# Patient Record
Sex: FEMALE | Race: WHITE | NOT HISPANIC OR LATINO | Employment: FULL TIME | URBAN - METROPOLITAN AREA
[De-identification: names, ages, dates, MRNs, and addresses within clinical notes are randomized per-mention and may not be internally consistent; named-entity substitution may affect disease eponyms.]

---

## 2024-09-06 ENCOUNTER — LAB VISIT (OUTPATIENT)
Dept: LAB | Facility: HOSPITAL | Age: 24
End: 2024-09-06
Attending: NURSE PRACTITIONER
Payer: COMMERCIAL

## 2024-09-06 ENCOUNTER — OFFICE VISIT (OUTPATIENT)
Dept: INTERNAL MEDICINE | Facility: CLINIC | Age: 24
End: 2024-09-06
Payer: COMMERCIAL

## 2024-09-06 VITALS
SYSTOLIC BLOOD PRESSURE: 124 MMHG | DIASTOLIC BLOOD PRESSURE: 70 MMHG | WEIGHT: 127.63 LBS | TEMPERATURE: 99 F | OXYGEN SATURATION: 100 % | BODY MASS INDEX: 24.93 KG/M2 | HEART RATE: 90 BPM

## 2024-09-06 DIAGNOSIS — Z00.00 ANNUAL PHYSICAL EXAM: ICD-10-CM

## 2024-09-06 DIAGNOSIS — F41.1 GAD (GENERALIZED ANXIETY DISORDER): ICD-10-CM

## 2024-09-06 DIAGNOSIS — Z00.00 ANNUAL PHYSICAL EXAM: Primary | ICD-10-CM

## 2024-09-06 LAB
BASOPHILS # BLD AUTO: 0.02 K/UL (ref 0–0.2)
BASOPHILS NFR BLD: 0.4 % (ref 0–1.9)
DIFFERENTIAL METHOD BLD: NORMAL
EOSINOPHIL # BLD AUTO: 0.1 K/UL (ref 0–0.5)
EOSINOPHIL NFR BLD: 2.3 % (ref 0–8)
ERYTHROCYTE [DISTWIDTH] IN BLOOD BY AUTOMATED COUNT: 12 % (ref 11.5–14.5)
HCT VFR BLD AUTO: 42.1 % (ref 37–48.5)
HGB BLD-MCNC: 13.8 G/DL (ref 12–16)
IMM GRANULOCYTES # BLD AUTO: 0.02 K/UL (ref 0–0.04)
IMM GRANULOCYTES NFR BLD AUTO: 0.4 % (ref 0–0.5)
LYMPHOCYTES # BLD AUTO: 2.1 K/UL (ref 1–4.8)
LYMPHOCYTES NFR BLD: 40.1 % (ref 18–48)
MCH RBC QN AUTO: 29.6 PG (ref 27–31)
MCHC RBC AUTO-ENTMCNC: 32.8 G/DL (ref 32–36)
MCV RBC AUTO: 90 FL (ref 82–98)
MONOCYTES # BLD AUTO: 0.4 K/UL (ref 0.3–1)
MONOCYTES NFR BLD: 6.8 % (ref 4–15)
NEUTROPHILS # BLD AUTO: 2.7 K/UL (ref 1.8–7.7)
NEUTROPHILS NFR BLD: 50 % (ref 38–73)
NRBC BLD-RTO: 0 /100 WBC
PLATELET # BLD AUTO: 238 K/UL (ref 150–450)
PMV BLD AUTO: 11.9 FL (ref 9.2–12.9)
RBC # BLD AUTO: 4.67 M/UL (ref 4–5.4)
WBC # BLD AUTO: 5.31 K/UL (ref 3.9–12.7)

## 2024-09-06 PROCEDURE — 84443 ASSAY THYROID STIM HORMONE: CPT | Performed by: NURSE PRACTITIONER

## 2024-09-06 PROCEDURE — 80061 LIPID PANEL: CPT | Performed by: NURSE PRACTITIONER

## 2024-09-06 PROCEDURE — 99999 PR PBB SHADOW E&M-NEW PATIENT-LVL III: CPT | Mod: PBBFAC,,, | Performed by: NURSE PRACTITIONER

## 2024-09-06 PROCEDURE — 85025 COMPLETE CBC W/AUTO DIFF WBC: CPT | Performed by: NURSE PRACTITIONER

## 2024-09-06 PROCEDURE — 80053 COMPREHEN METABOLIC PANEL: CPT | Performed by: NURSE PRACTITIONER

## 2024-09-06 PROCEDURE — 36415 COLL VENOUS BLD VENIPUNCTURE: CPT | Mod: PO | Performed by: NURSE PRACTITIONER

## 2024-09-06 RX ORDER — AMITRIPTYLINE HYDROCHLORIDE 25 MG/1
25 TABLET, FILM COATED ORAL NIGHTLY
Qty: 90 TABLET | Refills: 3 | Status: SHIPPED | OUTPATIENT
Start: 2024-09-06

## 2024-09-06 NOTE — PROGRESS NOTES
Subjective:       Patient ID: Mima Lockett is a 24 y.o. female.    Chief Complaint: Annual Exam    24 year old here for annual exam  On amitriptyline at night; takes for anxiety  Moved here from [place]; lives with fiance - getting  in Nov  Works as   Bowels move fine  Mood stable  Sleep is ok; sometimes takes her a while to fall asleep   Walks for exercise; does the stairs at work  Eats gluten free  Has hx of pcos   On ocp; cycles are monthly         /70   Pulse 90   Temp 98.8 °F (37.1 °C)   Wt 57.9 kg (127 lb 10.3 oz)   LMP 08/16/2024   SpO2 100%   BMI 24.93 kg/m²     Review of Systems   Constitutional:  Negative for activity change, appetite change, chills, diaphoresis, fatigue, fever and unexpected weight change.   HENT: Negative.     Respiratory:  Negative for cough and shortness of breath.    Cardiovascular:  Negative for chest pain, palpitations and leg swelling.   Gastrointestinal: Negative.    Genitourinary: Negative.    Musculoskeletal: Negative.    Skin:  Negative for color change, pallor, rash and wound.   Allergic/Immunologic: Negative for immunocompromised state.   Neurological: Negative.  Negative for dizziness and facial asymmetry.   Hematological:  Negative for adenopathy. Does not bruise/bleed easily.   Psychiatric/Behavioral:  Negative for agitation, behavioral problems and confusion.        Objective:      Physical Exam  Vitals and nursing note reviewed.   Constitutional:       General: She is not in acute distress.     Appearance: Normal appearance. She is well-developed. She is not diaphoretic.   HENT:      Head: Normocephalic and atraumatic.      Right Ear: Hearing, tympanic membrane, ear canal and external ear normal.      Left Ear: Hearing, tympanic membrane, ear canal and external ear normal.      Nose: Nose normal. No mucosal edema or rhinorrhea.      Right Sinus: No maxillary sinus tenderness or frontal sinus tenderness.      Left Sinus: No  maxillary sinus tenderness or frontal sinus tenderness.      Mouth/Throat:      Pharynx: Uvula midline. No oropharyngeal exudate or posterior oropharyngeal erythema.   Eyes:      General:         Right eye: No discharge.         Left eye: No discharge.      Conjunctiva/sclera: Conjunctivae normal.      Pupils: Pupils are equal, round, and reactive to light.   Neck:      Thyroid: No thyroid mass or thyromegaly.      Vascular: No JVD.      Trachea: Trachea normal. No tracheal deviation.   Cardiovascular:      Rate and Rhythm: Normal rate and regular rhythm.      Heart sounds: Normal heart sounds. No murmur heard.  Pulmonary:      Effort: Pulmonary effort is normal. No respiratory distress.      Breath sounds: Normal breath sounds. No stridor. No decreased breath sounds, wheezing, rhonchi or rales.   Chest:      Chest wall: No tenderness.   Abdominal:      General: Bowel sounds are normal. There is no distension.      Palpations: Abdomen is soft. Abdomen is not rigid. There is no fluid wave.      Tenderness: There is no abdominal tenderness. There is no guarding or rebound.   Musculoskeletal:         General: Normal range of motion.      Cervical back: Normal range of motion.   Lymphadenopathy:      Head:      Right side of head: No tonsillar adenopathy.      Left side of head: No tonsillar adenopathy.      Cervical: No cervical adenopathy.   Skin:     General: Skin is warm and dry.      Findings: No rash.   Neurological:      Mental Status: She is alert and oriented to person, place, and time.   Psychiatric:         Speech: Speech normal.         Behavior: Behavior normal. Behavior is cooperative.         Thought Content: Thought content normal.         Judgment: Judgment normal.         Assessment:       1. Annual physical exam    2. KULWANT (generalized anxiety disorder)        Plan:       Mima was seen today for annual exam.    Diagnoses and all orders for this visit:    Annual physical exam  -     Comprehensive  Metabolic Panel; Future  -     CBC Auto Differential; Future  -     TSH; Future  -     Lipid Panel; Future    KULWANT (generalized anxiety disorder)  -     amitriptyline (ELAVIL) 25 MG tablet; Take 1 tablet (25 mg total) by mouth every evening.      Discussed age appropriate anticipatory guidance, healthy diet and lifestyle, regular exercise, weight management.  Fasting labs today  Refill given  Follow up with Primary Care Physician annually and prn

## 2024-09-07 LAB
ALBUMIN SERPL BCP-MCNC: 4.2 G/DL (ref 3.5–5.2)
ALP SERPL-CCNC: 52 U/L (ref 55–135)
ALT SERPL W/O P-5'-P-CCNC: 11 U/L (ref 10–44)
ANION GAP SERPL CALC-SCNC: 10 MMOL/L (ref 8–16)
AST SERPL-CCNC: 16 U/L (ref 10–40)
BILIRUB SERPL-MCNC: 0.3 MG/DL (ref 0.1–1)
BUN SERPL-MCNC: 11 MG/DL (ref 6–20)
CALCIUM SERPL-MCNC: 9.9 MG/DL (ref 8.7–10.5)
CHLORIDE SERPL-SCNC: 104 MMOL/L (ref 95–110)
CO2 SERPL-SCNC: 24 MMOL/L (ref 23–29)
CREAT SERPL-MCNC: 0.7 MG/DL (ref 0.5–1.4)
EST. GFR  (NO RACE VARIABLE): >60 ML/MIN/1.73 M^2
GLUCOSE SERPL-MCNC: 90 MG/DL (ref 70–110)
POTASSIUM SERPL-SCNC: 4 MMOL/L (ref 3.5–5.1)
PROT SERPL-MCNC: 8 G/DL (ref 6–8.4)
SODIUM SERPL-SCNC: 138 MMOL/L (ref 136–145)
TRIGL SERPL-MCNC: 87 MG/DL (ref 30–150)
TSH SERPL DL<=0.005 MIU/L-ACNC: 0.53 UIU/ML (ref 0.4–4)

## 2024-12-13 ENCOUNTER — OFFICE VISIT (OUTPATIENT)
Dept: INTERNAL MEDICINE | Facility: CLINIC | Age: 24
End: 2024-12-13
Payer: COMMERCIAL

## 2024-12-13 VITALS
BODY MASS INDEX: 25.84 KG/M2 | SYSTOLIC BLOOD PRESSURE: 112 MMHG | DIASTOLIC BLOOD PRESSURE: 76 MMHG | TEMPERATURE: 98 F | OXYGEN SATURATION: 99 % | HEART RATE: 98 BPM | HEIGHT: 60 IN | WEIGHT: 131.63 LBS

## 2024-12-13 DIAGNOSIS — G47.9 SLEEP DISORDER: ICD-10-CM

## 2024-12-13 DIAGNOSIS — K90.0 TRANSIENT GLUTEN SENSITIVITY: ICD-10-CM

## 2024-12-13 DIAGNOSIS — Z79.899 ENCOUNTER FOR LONG-TERM (CURRENT) USE OF MEDICATIONS: ICD-10-CM

## 2024-12-13 DIAGNOSIS — Z76.89 ENCOUNTER TO ESTABLISH CARE: ICD-10-CM

## 2024-12-13 DIAGNOSIS — E28.2 PCOS (POLYCYSTIC OVARIAN SYNDROME): Chronic | ICD-10-CM

## 2024-12-13 DIAGNOSIS — R53.83 FATIGUE, UNSPECIFIED TYPE: Primary | ICD-10-CM

## 2024-12-13 DIAGNOSIS — E66.3 OVERWEIGHT (BMI 25.0-29.9): ICD-10-CM

## 2024-12-13 PROCEDURE — 1159F MED LIST DOCD IN RCRD: CPT | Mod: CPTII,S$GLB,, | Performed by: FAMILY MEDICINE

## 2024-12-13 PROCEDURE — 3044F HG A1C LEVEL LT 7.0%: CPT | Mod: CPTII,S$GLB,, | Performed by: FAMILY MEDICINE

## 2024-12-13 PROCEDURE — 99204 OFFICE O/P NEW MOD 45 MIN: CPT | Mod: S$GLB,,, | Performed by: FAMILY MEDICINE

## 2024-12-13 PROCEDURE — 99999 PR PBB SHADOW E&M-EST. PATIENT-LVL IV: CPT | Mod: PBBFAC,,, | Performed by: FAMILY MEDICINE

## 2024-12-13 PROCEDURE — 1160F RVW MEDS BY RX/DR IN RCRD: CPT | Mod: CPTII,S$GLB,, | Performed by: FAMILY MEDICINE

## 2024-12-13 PROCEDURE — 3074F SYST BP LT 130 MM HG: CPT | Mod: CPTII,S$GLB,, | Performed by: FAMILY MEDICINE

## 2024-12-13 PROCEDURE — 3078F DIAST BP <80 MM HG: CPT | Mod: CPTII,S$GLB,, | Performed by: FAMILY MEDICINE

## 2024-12-13 PROCEDURE — 3008F BODY MASS INDEX DOCD: CPT | Mod: CPTII,S$GLB,, | Performed by: FAMILY MEDICINE

## 2024-12-13 RX ORDER — LEVOCETIRIZINE DIHYDROCHLORIDE 5 MG/1
5 TABLET, FILM COATED ORAL DAILY
Qty: 90 TABLET | Refills: 3 | Status: SHIPPED | OUTPATIENT
Start: 2024-12-13

## 2024-12-13 NOTE — LETTER
December 19, 2024      [facility]  [address] [place] [address]  Phone: [phone]  Fax: [phone]       Patient: Mima Lockett   YOB: 2000  Date of Visit: 12/19/2024    To Whom It May Concern:    Eleazar Lockett  was at [facility] on 12/19/2024. The patient may return to work/school on *** {With/no:00731} restrictions. If you have any questions or concerns, or if I can be of further assistance, please do not hesitate to contact me.    Sincerely,    Yahaira Pruett MA

## 2024-12-20 ENCOUNTER — LAB VISIT (OUTPATIENT)
Dept: LAB | Facility: HOSPITAL | Age: 24
End: 2024-12-20
Attending: FAMILY MEDICINE
Payer: COMMERCIAL

## 2024-12-20 DIAGNOSIS — R53.83 FATIGUE, UNSPECIFIED TYPE: ICD-10-CM

## 2024-12-20 DIAGNOSIS — K90.0 TRANSIENT GLUTEN SENSITIVITY: ICD-10-CM

## 2024-12-20 DIAGNOSIS — Z79.899 ENCOUNTER FOR LONG-TERM (CURRENT) USE OF MEDICATIONS: ICD-10-CM

## 2024-12-20 LAB
ESTIMATED AVG GLUCOSE: 97 MG/DL (ref 68–131)
FERRITIN SERPL-MCNC: 229 NG/ML (ref 20–300)
FOLATE SERPL-MCNC: 10.8 NG/ML (ref 4–24)
HBA1C MFR BLD: 5 % (ref 4–5.6)
INSULIN COLLECTION INTERVAL: NORMAL
INSULIN SERPL-ACNC: 5.6 UU/ML
IRON SERPL-MCNC: 108 UG/DL (ref 30–160)
SATURATED IRON: 30 % (ref 20–50)
T4 FREE SERPL-MCNC: 0.93 NG/DL (ref 0.71–1.51)
TOTAL IRON BINDING CAPACITY: 364 UG/DL (ref 250–450)
TRANSFERRIN SERPL-MCNC: 246 MG/DL (ref 200–375)
TSH SERPL DL<=0.005 MIU/L-ACNC: 1.12 UIU/ML (ref 0.4–4)
VIT B12 SERPL-MCNC: 314 PG/ML (ref 210–950)

## 2024-12-20 PROCEDURE — 82746 ASSAY OF FOLIC ACID SERUM: CPT | Performed by: FAMILY MEDICINE

## 2024-12-20 PROCEDURE — 82728 ASSAY OF FERRITIN: CPT | Performed by: FAMILY MEDICINE

## 2024-12-20 PROCEDURE — 82784 ASSAY IGA/IGD/IGG/IGM EACH: CPT | Performed by: FAMILY MEDICINE

## 2024-12-20 PROCEDURE — 83036 HEMOGLOBIN GLYCOSYLATED A1C: CPT | Performed by: FAMILY MEDICINE

## 2024-12-20 PROCEDURE — 82607 VITAMIN B-12: CPT | Performed by: FAMILY MEDICINE

## 2024-12-20 PROCEDURE — 83525 ASSAY OF INSULIN: CPT | Performed by: FAMILY MEDICINE

## 2024-12-20 PROCEDURE — 83540 ASSAY OF IRON: CPT | Performed by: FAMILY MEDICINE

## 2024-12-20 PROCEDURE — 84439 ASSAY OF FREE THYROXINE: CPT | Performed by: FAMILY MEDICINE

## 2024-12-20 PROCEDURE — 84443 ASSAY THYROID STIM HORMONE: CPT | Performed by: FAMILY MEDICINE

## 2024-12-23 LAB
GLIADIN PEPTIDE IGA SER-ACNC: 0.7 U/ML
GLIADIN PEPTIDE IGG SER-ACNC: <0.6 U/ML
IGA SERPL-MCNC: 245 MG/DL (ref 70–400)
TTG IGA SER-ACNC: 0.4 U/ML
TTG IGG SER-ACNC: <0.6 U/ML

## 2025-01-01 NOTE — PROGRESS NOTES
Subjective     Patient ID: Mima Lockett is a 24 y.o. female.    Chief Complaint: Establish Care    History of Present Illness    CHIEF COMPLAINT:  Mima presents for a regular checkup with concerns about PCOS, potential celiac disease, and difficulty managing food cravings.    HPI:  Mima, previously diagnosed with PCOS in late 2022, reports ongoing fatigue and difficulty losing weight in problem areas such as her abdomen. She has lost about 40 lbs over the last 5 years, going from 170 lbs to fluctuating between 120-130 lbs currently. Mima has persistent thoughts about eating even when not hungry, which interferes with her weight management efforts.    She has been gluten-free since early 2022, noting improved well-being after eliminating gluten from her diet. She has never been tested for celiac disease and does not strictly adhere to a gluten-free diet, occasionally consuming small amounts of gluten and not being concerned about cross-contamination.    Mima reports difficulty falling asleep but maintaining sleep once initiated. She has significant morning fatigue, finding it challenging to get out of bed. She uses a dental device to help with a sleep-related breathing issue, where she would awaken to relax her throat muscles to prevent oxygen drops. The device is beneficial when used consistently, but her usage has been inconsistent recently.    Mima has dysmenorrhea, though it has improved since starting birth control. She is currently on birth control and spironolactone for PCOS management, prescribed by her OB-GYN and dermatologist respectively.    She attempts to exercise about 3 times per week, though this has decreased since starting a new job 6 months ago. Post-work fatigue interferes with evening exercise.    Mima denies any current bowel problems such as constipation or diarrhea. She denies having celiac disease or sleep apnea, though she has not been formally tested for  celiac.    MEDICATIONS:  Mima is on birth control for PCOS. She is also taking Spironolactone for PCOS, which was prescribed by her dermatologist. She is on Amitriptyline. For allergies, she uses OTC XYZAL (levocetirizine).    MEDICAL HISTORY:  Mima has a history of PCOS, diagnosed in late . She also has a sleep disorder, diagnosed in , which is similar to sleep apnea but without oxygen loss. In early , she discovered she has gluten sensitivity. Mima's last Pap smear was in 2023. She is currently using birth control for contraception. Her obstetric history is . She has never been on hormone replacement therapy (HRT).    TEST RESULTS:  Mima recently underwent a CBC with normal results. She also had a chemistry panel done recently, which showed normal results for sodium, potassium, kidney function, and liver function. A thyroid screen and cholesterol panel were also recently completed. In , the patient underwent a sleep test. The results showed that she would awaken to relax throat muscles to stop oxygen from dropping, but she was not diagnosed with sleep apnea. In late , labs confirmed her PCOS diagnosis.    SOCIAL HISTORY:  Mima consumes approximately one soft drink daily. She started her career job 6 months ago. She is recently .    ROS:  General: -fever, -chills, +fatigue, -weight gain, -weight loss  Eyes: -vision changes, -redness, -discharge  ENT: -ear pain, -nasal congestion, -sore throat  Cardiovascular: -chest pain, -palpitations, -lower extremity edema  Respiratory: -cough, -shortness of breath  Gastrointestinal: -abdominal pain, -nausea, -vomiting, -diarrhea, -constipation, -blood in stool  Genitourinary: -dysuria, -hematuria, -frequency  Musculoskeletal: -joint pain, -muscle pain  Skin: -rash, -lesion  Neurological: -headache, -dizziness, -numbness, -tingling  Psychiatric: -anxiety, -depression, +sleep difficulty            Objective     Physical  Exam  Vitals and nursing note reviewed.   Constitutional:       Appearance: Normal appearance. She is normal weight.   HENT:      Head: Normocephalic and atraumatic.   Eyes:      Extraocular Movements: Extraocular movements intact.      Conjunctiva/sclera: Conjunctivae normal.   Cardiovascular:      Rate and Rhythm: Normal rate and regular rhythm.      Pulses: Normal pulses.      Heart sounds: Normal heart sounds.   Pulmonary:      Effort: Pulmonary effort is normal.      Breath sounds: Normal breath sounds.   Musculoskeletal:      Cervical back: Normal range of motion and neck supple.   Skin:     General: Skin is warm and dry.   Neurological:      General: No focal deficit present.      Mental Status: She is alert and oriented to person, place, and time.   Psychiatric:         Mood and Affect: Mood normal.         Behavior: Behavior normal.                Assessment and Plan     1. Fatigue, unspecified type  -     VITAMIN B12; Future; Expected date: 01/03/2025  -     IRON AND TIBC; Future; Expected date: 01/03/2025  -     FERRITIN; Future; Expected date: 01/03/2025  -     TSH; Future; Expected date: 01/03/2025  -     T4, FREE; Future; Expected date: 01/03/2025    2. Encounter for long-term (current) use of medications  -     Insulin, random; Future; Expected date: 01/03/2025  -     HEMOGLOBIN A1C; Future; Expected date: 01/03/2025  -     VITAMIN B12; Future; Expected date: 01/03/2025  -     FOLATE; Future; Expected date: 01/03/2025  -     IRON AND TIBC; Future; Expected date: 01/03/2025  -     FERRITIN; Future; Expected date: 01/03/2025    3. Transient gluten sensitivity  -     Celiac Disease Panel; Future; Expected date: 01/03/2025    4. Encounter to establish care    5. PCOS (polycystic ovarian syndrome)  Comments:  stable on spironolactone    6. Overweight (BMI 25.0-29.9)    7. Sleep disorder    Other orders  -     levocetirizine (XYZAL) 5 MG tablet; Take 1 tablet (5 mg total) by mouth once daily.  Dispense: 90  tablet; Refill: 3        Assessment & Plan    Evaluated for PCOS diagnosis, celiac disease, and insulin resistance  Assessed fatigue, sleep issues, and nutritional status  Reviewed current PCOS treatment regimen (birth control and spironolactone)  Considered potential addition of metformin for insulin resistance and food cravings    PATIENT EDUCATION:   Emphasized the importance of protein intake (100-150 g daily) to manage food cravings   Discussed the link between fatigue and increased food cravings   Advised on the importance of hydration, especially while taking spironolactone   Provided information on HPV vaccine and its role in cervical cancer prevention    ACTION ITEMS/LIFESTYLE:   Mima to increase protein intake to 100-150 g daily   Mima to improve hydration, especially due to spironolactone use   Mima to prepare protein-rich snacks to manage food cravings   Mima to continue wearing sleep device as frequently as possible   Recommend implementing a nighttime routine to improve sleep quality   Mima can consider using a sound machine or white noise for better sleep   Mima to maintain current exercise routine of 3 times per week    MEDICATIONS:   Started multivitamin (recommended Leighann Joy's liquid formulation)   Refilled Xyzal (levocetirizine) 90-day supply with 3 refills   Continued birth control and spironolactone for PCOS treatment    ORDERS:   Celiac panel, insulin level, A1C, thyroid panel (TSH and T4), B12 level, folate level, iron panel, and repeat cholesterol panel ordered    FOLLOW UP:   Follow up in 4 weeks after completing lab work for result review and further evaluation   If unavailable, follow-up visit to be scheduled with GINA Ridley              Follow up in about 4 weeks (around 1/10/2025).    This note was generated with the assistance of ambient listening technology. Verbal consent was obtained by the patient and accompanying visitor(s) for the recording of patient  appointment to facilitate this note. I attest to having reviewed and edited the generated note for accuracy, though some syntax or spelling errors may persist. Please contact the author of this note for any clarification.

## 2025-01-10 ENCOUNTER — OFFICE VISIT (OUTPATIENT)
Dept: INTERNAL MEDICINE | Facility: CLINIC | Age: 25
End: 2025-01-10
Payer: COMMERCIAL

## 2025-01-10 VITALS
HEIGHT: 60 IN | SYSTOLIC BLOOD PRESSURE: 114 MMHG | RESPIRATION RATE: 16 BRPM | OXYGEN SATURATION: 97 % | HEART RATE: 120 BPM | DIASTOLIC BLOOD PRESSURE: 70 MMHG | WEIGHT: 136 LBS | TEMPERATURE: 98 F | BODY MASS INDEX: 26.7 KG/M2

## 2025-01-10 DIAGNOSIS — R53.83 FATIGUE, UNSPECIFIED TYPE: ICD-10-CM

## 2025-01-10 DIAGNOSIS — G47.9 SLEEP DISORDER: ICD-10-CM

## 2025-01-10 DIAGNOSIS — E66.3 OVERWEIGHT (BMI 25.0-29.9): ICD-10-CM

## 2025-01-10 DIAGNOSIS — K90.0 TRANSIENT GLUTEN SENSITIVITY: ICD-10-CM

## 2025-01-10 DIAGNOSIS — E28.2 PCOS (POLYCYSTIC OVARIAN SYNDROME): ICD-10-CM

## 2025-01-10 DIAGNOSIS — K21.9 GASTROESOPHAGEAL REFLUX DISEASE, UNSPECIFIED WHETHER ESOPHAGITIS PRESENT: Primary | ICD-10-CM

## 2025-01-10 PROCEDURE — 99999 PR PBB SHADOW E&M-EST. PATIENT-LVL IV: CPT | Mod: PBBFAC,,,

## 2025-01-10 RX ORDER — PANTOPRAZOLE SODIUM 20 MG/1
20 TABLET, DELAYED RELEASE ORAL DAILY
Qty: 90 TABLET | Refills: 3 | Status: SHIPPED | OUTPATIENT
Start: 2025-01-10 | End: 2026-01-10

## 2025-01-10 NOTE — PROGRESS NOTES
Subjective:       Patient ID: Mima Lockett is a 24 y.o. female.    Chief Complaint: Follow-up (4W F/U: Pcos, Overweight, Fatigue & Sleep Disorder)    History of Present Illness    CHIEF COMPLAINT:  Patient presents today for a follow-up on lab results and medication refill.    LABS:  Thyroid function, iron, ferritin, folate, B12, random insulin were all normal. A1c was 5.0. Celiac disease panel was normal.    MEDICAL HISTORY:  She was diagnosed with acid reflux in 2022 and takes pantoprazole as needed. She has PCOS and has been following a gluten-free diet for approximately two years, though she is not strictly compliant and occasionally consumes foods containing gluten such as soy sauce without avoiding cross-contamination.    FATIGUE AND SLEEP:  She reports improvement in fatigue with increased protein intake, hydration, multivitamins, and electrolyte water consumption. Her sleep quality has improved with the use of a wire retainer and decreased screen time before bed.    EXERCISE:  She exercises 3-4 times per week, primarily walking.          /70 (BP Location: Right arm, Patient Position: Sitting)   Pulse (!) 120   Temp 98.4 °F (36.9 °C) (Tympanic)   Resp 16   Ht 5' (1.524 m)   Wt 61.7 kg (136 lb 0.4 oz)   LMP 12/03/2024 (Approximate)   SpO2 97%   BMI 26.57 kg/m²     Review of Systems   Constitutional:  Negative for chills and fever.   Eyes:  Negative for visual disturbance.   Respiratory:  Negative for chest tightness and shortness of breath.    Cardiovascular:  Negative for chest pain, palpitations and leg swelling.   Gastrointestinal:  Negative for constipation, diarrhea, nausea and vomiting.   Genitourinary:  Negative for difficulty urinating.   Neurological:  Negative for headaches.   All other systems reviewed and are negative.      Objective:      Physical Exam  Vitals reviewed.   Constitutional:       General: She is not in acute distress.     Appearance: Normal appearance. She is  not ill-appearing or toxic-appearing.   HENT:      Head: Normocephalic and atraumatic.   Eyes:      Pupils: Pupils are equal, round, and reactive to light.   Cardiovascular:      Rate and Rhythm: Normal rate and regular rhythm.   Pulmonary:      Effort: Pulmonary effort is normal.      Breath sounds: Normal breath sounds.   Abdominal:      General: Bowel sounds are normal.      Palpations: Abdomen is soft.   Musculoskeletal:         General: Normal range of motion.      Cervical back: Normal range of motion and neck supple.   Skin:     General: Skin is warm and dry.   Neurological:      General: No focal deficit present.      Mental Status: She is alert and oriented to person, place, and time.   Psychiatric:         Mood and Affect: Mood normal.         Behavior: Behavior normal.         Thought Content: Thought content normal.         Judgment: Judgment normal.         Assessment:       1. Gastroesophageal reflux disease, unspecified whether esophagitis present    2. Fatigue, unspecified type    3. Transient gluten sensitivity    4. PCOS (polycystic ovarian syndrome)    5. Sleep disorder    6. Overweight (BMI 25.0-29.9)        Plan:       Gastroesophageal reflux disease, unspecified whether esophagitis present  -     pantoprazole (PROTONIX) 20 MG tablet; Take 1 tablet (20 mg total) by mouth once daily.    Fatigue, unspecified type    Transient gluten sensitivity    PCOS (polycystic ovarian syndrome)    Sleep disorder    Overweight (BMI 25.0-29.9)    Assessment & Plan    IMPRESSION:  - Reviewed lab results including thyroid function, iron, ferritin, folate, B12, A1c, random insulin, and celiac disease panel - all within normal range  - Considered history of PCOS and current gluten avoidance practices  - Assessed improvement in fatigue symptoms following previous recommendations  - Evaluated effectiveness of current sleep device (retainer) and nighttime routine  - Noted slight weight increase, attributed to holiday  season    ACID REFLUX:  - Refilled pantoprazole 20 mg for acid reflux, to be taken as needed.  - Noted that the patient has been taking pantoprazole for acid reflux for approximately 2 years, but not daily.    POLYCYSTIC OVARY SYNDROME (PCOS):  - Reviewed labs related to PCOS, including thyroid function, iron, ferritin, folate, B12, A1c, and random insulin, which were all within normal ranges.  - Continued spironolactone for PCOS management.    GLUTEN SENSITIVITY:  - Noted that the patient reports sensitivity to gluten, but not a severe reaction.  - Reviewed celiac disease panel, which was normal.  - Acknowledged the patient's self-reported gluten sensitivity despite normal celiac panel.    HYDRATION:  - Discussed the importance of electrolytes & water for hydration.  - Acknowledged the patient's efforts to increase protein intake and improve hydration.    SLEEP APNEA:  - Noted that the patient uses a retainer-like device for sleep apnea management.  - Confirmed that the patient reports improved sleep with the device.  - Inquired about the patient's nighttime routine and screen time habits.  - Advised the patient to maintain decreased screen time before bed as part of nighttime routine.    EXERCISE:  - Patient to continue current exercise routine of walking 3-4 times per week.    FOLLOW UP:  - Follow up in 9 months for annual appointment.  - Recheck labs at annual visit.  - Contact the office if sick or have any questions before scheduled appointment.       Follow up in about 9 months (around 10/10/2025), or if symptoms worsen or fail to improve, for annual.